# Patient Record
Sex: MALE | Race: WHITE
[De-identification: names, ages, dates, MRNs, and addresses within clinical notes are randomized per-mention and may not be internally consistent; named-entity substitution may affect disease eponyms.]

---

## 2019-02-19 ENCOUNTER — HOSPITAL ENCOUNTER (OUTPATIENT)
Dept: HOSPITAL 84 - D.LABREF | Age: 64
Discharge: HOME | End: 2019-02-19
Attending: ORTHOPAEDIC SURGERY
Payer: SELF-PAY

## 2019-02-19 DIAGNOSIS — M17.12: Primary | ICD-10-CM

## 2019-02-19 DIAGNOSIS — Z11.8: ICD-10-CM

## 2019-02-25 ENCOUNTER — HOSPITAL ENCOUNTER (INPATIENT)
Dept: HOSPITAL 84 - D.SDCHOLD | Age: 64
LOS: 17 days | Discharge: HOME | DRG: 470 | End: 2019-03-14
Attending: ORTHOPAEDIC SURGERY | Admitting: ORTHOPAEDIC SURGERY
Payer: COMMERCIAL

## 2019-02-25 VITALS
HEIGHT: 71 IN | BODY MASS INDEX: 32.83 KG/M2 | BODY MASS INDEX: 32.83 KG/M2 | WEIGHT: 234.49 LBS | BODY MASS INDEX: 32.83 KG/M2

## 2019-02-25 DIAGNOSIS — E78.5: ICD-10-CM

## 2019-02-25 DIAGNOSIS — M17.12: Primary | ICD-10-CM

## 2019-02-25 DIAGNOSIS — I10: ICD-10-CM

## 2019-03-06 LAB
ANION GAP SERPL CALC-SCNC: 12.2 MMOL/L (ref 8–16)
APPEARANCE UR: (no result)
APTT BLD: 26.7 SECONDS (ref 22.8–39.4)
BACTERIA #/AREA URNS HPF: (no result) /HPF
BASOPHILS NFR BLD AUTO: 0.2 % (ref 0–2)
BILIRUB SERPL-MCNC: NEGATIVE MG/DL
BUN SERPL-MCNC: 16 MG/DL (ref 7–18)
CALCIUM SERPL-MCNC: 8.9 MG/DL (ref 8.5–10.1)
CHLORIDE SERPL-SCNC: 103 MMOL/L (ref 98–107)
CO2 SERPL-SCNC: 29 MMOL/L (ref 21–32)
COLOR UR: YELLOW
CREAT SERPL-MCNC: 0.7 MG/DL (ref 0.6–1.3)
EOSINOPHIL NFR BLD: 1.5 % (ref 0–7)
ERYTHROCYTE [DISTWIDTH] IN BLOOD BY AUTOMATED COUNT: 13.3 % (ref 11.5–14.5)
GLUCOSE SERPL-MCNC: 111 MG/DL (ref 74–106)
GLUCOSE SERPL-MCNC: NEGATIVE MG/DL
HCT VFR BLD CALC: 43.4 % (ref 42–54)
HGB BLD-MCNC: 14.6 G/DL (ref 13.5–17.5)
IMM GRANULOCYTES NFR BLD: 1.2 % (ref 0–5)
INR PPP: 0.96 (ref 0.85–1.17)
KETONES UR STRIP-MCNC: NEGATIVE MG/DL
LYMPHOCYTES NFR BLD AUTO: 21.9 % (ref 15–50)
MCH RBC QN AUTO: 31.5 PG (ref 26–34)
MCHC RBC AUTO-ENTMCNC: 33.6 G/DL (ref 31–37)
MCV RBC: 93.7 FL (ref 80–100)
MONOCYTES NFR BLD: 7 % (ref 2–11)
NEUTROPHILS NFR BLD AUTO: 68.2 % (ref 40–80)
NITRITE UR-MCNC: NEGATIVE MG/ML
OSMOLALITY SERPL CALC.SUM OF ELEC: 280 MOSM/KG (ref 275–300)
PH UR STRIP: 5 [PH] (ref 5–6)
PLATELET # BLD: 161 10X3/UL (ref 130–400)
PMV BLD AUTO: 10.5 FL (ref 7.4–10.4)
POTASSIUM SERPL-SCNC: 4.2 MMOL/L (ref 3.5–5.1)
PROT UR-MCNC: NEGATIVE MG/DL
PROTHROMBIN TIME: 12.3 SECONDS (ref 11.6–15)
RBC # BLD AUTO: 4.63 10X6/UL (ref 4.2–6.1)
RBC #/AREA URNS HPF: (no result) /HPF (ref 0–5)
SODIUM SERPL-SCNC: 140 MMOL/L (ref 136–145)
SP GR UR STRIP: 1.01 (ref 1–1.02)
SQUAMOUS #/AREA URNS HPF: (no result) /HPF (ref 0–5)
UROBILINOGEN UR-MCNC: NORMAL MG/DL
WBC # BLD AUTO: 8.3 10X3/UL (ref 4.8–10.8)

## 2019-03-07 ENCOUNTER — HOSPITAL ENCOUNTER (OUTPATIENT)
Dept: HOSPITAL 84 - D.CT | Age: 64
Discharge: HOME | End: 2019-03-07
Attending: ORTHOPAEDIC SURGERY
Payer: COMMERCIAL

## 2019-03-07 DIAGNOSIS — J84.9: Primary | ICD-10-CM

## 2019-03-07 NOTE — NUR
PATIENT SHOWED UP TO OUR DEPT AS AN OUTPATIENT, NO ORDERS WERE SUBMITTED IN
[facility].
CALLED ORDERING PHYSICIAN TO FIGURE OUT WHAT TYPE OF SCAN THE PATIENT NEEDED.
DR. BAN TORRES GAVE ME A VERBAL ORDER TO DO A CT CHEST WITHOUT CONTRAST FOR AN
ABNORMAL CHEST XRAY @ 1750.

## 2019-03-11 VITALS — DIASTOLIC BLOOD PRESSURE: 92 MMHG | SYSTOLIC BLOOD PRESSURE: 141 MMHG

## 2019-03-11 VITALS — DIASTOLIC BLOOD PRESSURE: 82 MMHG | SYSTOLIC BLOOD PRESSURE: 140 MMHG

## 2019-03-11 VITALS — SYSTOLIC BLOOD PRESSURE: 131 MMHG | DIASTOLIC BLOOD PRESSURE: 75 MMHG

## 2019-03-11 PROCEDURE — 0SRD0JZ REPLACEMENT OF LEFT KNEE JOINT WITH SYNTHETIC SUBSTITUTE, OPEN APPROACH: ICD-10-PCS | Performed by: ORTHOPAEDIC SURGERY

## 2019-03-12 VITALS — DIASTOLIC BLOOD PRESSURE: 71 MMHG | SYSTOLIC BLOOD PRESSURE: 128 MMHG

## 2019-03-12 VITALS — SYSTOLIC BLOOD PRESSURE: 114 MMHG | DIASTOLIC BLOOD PRESSURE: 63 MMHG

## 2019-03-12 VITALS — DIASTOLIC BLOOD PRESSURE: 75 MMHG | SYSTOLIC BLOOD PRESSURE: 140 MMHG

## 2019-03-12 VITALS — SYSTOLIC BLOOD PRESSURE: 125 MMHG | DIASTOLIC BLOOD PRESSURE: 76 MMHG

## 2019-03-12 VITALS — DIASTOLIC BLOOD PRESSURE: 68 MMHG | SYSTOLIC BLOOD PRESSURE: 118 MMHG

## 2019-03-12 LAB
ERYTHROCYTE [DISTWIDTH] IN BLOOD BY AUTOMATED COUNT: 13.9 % (ref 11.5–14.5)
HCT VFR BLD CALC: 39 % (ref 42–54)
HGB BLD-MCNC: 12.8 G/DL (ref 13.5–17.5)
MCH RBC QN AUTO: 31.1 PG (ref 26–34)
MCHC RBC AUTO-ENTMCNC: 32.8 G/DL (ref 31–37)
MCV RBC: 94.7 FL (ref 80–100)
PLATELET # BLD: 187 10X3/UL (ref 130–400)
PMV BLD AUTO: 10.8 FL (ref 7.4–10.4)
RBC # BLD AUTO: 4.12 10X6/UL (ref 4.2–6.1)
WBC # BLD AUTO: 14.1 10X3/UL (ref 4.8–10.8)

## 2019-03-12 NOTE — MORECARE
CASE MANAGEMENT DISCHARGE SUMMARY
 
 
PATIENT: SIL GUERIN                     UNIT: [id number]
ACCOUNT#: [id number]                       ADM DATE: 19
AGE: 63     : 59  SEX: M            ROOM/BED: D.2236    
AUTHOR: ALBIN SCHMITZ                             PHYSICIAN:                               
 
REFERRING PHYSICIAN: BAN TORRES MD        
DATE OF SERVICE: 19
Discharge Plan
 
 
Patient Name: SIL GUERIN
[facility]
Encounter #: [id number]
Medical Record #: [id number]
: 1955
Planned Disposition: Home
Anticipated Discharge Date: 
 
Discharge Date: 
Expected LOS: 
Initial Reviewer: [email or web address]
Initial Review Date: 2019
Generated: 3/12/19   1:46 pm 
  
 
 
External Providers
External Provider: OTHER-OTHER
 
Next Contact Date: 
Service Request Date: 
Service Type: 
Resolution: 
 
Reviewer: 
Comments: 
 
 
 
 
 
Patient Name: SIL GUERIN
 
Encounter #: [id number]
Page [phone]
 
 
 
 
 
Electronically Signed by ALBIN SCHMITZ on 19 at 1246
 
 
 
 
 
 
**All edits/amendments must be made on the electronic document**
 
DICTATION DATE: 19 1245     : ENEDINA  19 1245     
RPT#: [id number]                                DC DATE:        
                                               STATUS: ADM IN  
[facility]
[address] [place] [address]
***END OF REPORT***

## 2019-03-12 NOTE — MORECARE
CASE MANAGEMENT DISCHARGE SUMMARY
 
 
PATIENT: DK LOZADA                     UNIT: [id number]
ACCOUNT#: [id number]                       ADM DATE: 19
AGE: 63     : 59  SEX: M            ROOM/BED: D.2236    
AUTHOR: NADIRDOC                             PHYSICIAN:                               
 
REFERRING PHYSICIAN: BAN TORRES MD        
DATE OF SERVICE: 19
Discharge Plan
 
 
Patient Name: DK LOZADA
Facility: [facility]:[place]
Encounter #: [id number]
Medical Record #: [id number]
: 1955
Planned Disposition: Home
Anticipated Discharge Date: 
 
Discharge Date: 
Expected LOS: 
Initial Reviewer: [email or web address]
Initial Review Date: 2019
Generated: 3/12/19   2:21 pm 
Comments
 
DCP- Discharge Planning
 
Updated by [email or web address]: Jagruti Alatorre on 3/12/19  12:18 pm CT
Patient Name: DK LOZADA                                     
Admission Status: Elective   
Accout number: [id number]                              
Admission Date: 2019   
: 1955                                                        
Admission Diagnosis:   
Attending: BAN TORRES                                               
Current LOS:  1   
  
Anticipated DC Date:    
Planned Disposition: Home   
Primary Insurance: [employer]   
  
  
Discharge Planning Comments: CM met with patient to discuss discharge 
planning/needs, he is alone in the room. States he lives with his wife. 
States he is independent with all ADL's and AIDL's. States his wife will 
drive him home on discharge and to his outpatient PT appointments. States he 
will use [facility] for outpt PT. States he has already been going there.
 
 I called [facility] and they are unable to help him with DME, they do not have 
CPM machines. I called [employer] and spoke to Dk, he already has the order 
for DME from Dr. Torres's office. He states he will call the patient and 
deliver it to his home. He does have a 2 wheeled walker that he borrowed that 
he will use to get home. [facility] and spoke to 
Yasemin. Yasemin states they will call him with the appointment date and time.
 Order and clinical faxed to them. CM will continue to follow and assist with 
discharge planning/needs.  
  
  
  
  
  
  
: Jagruti Alatorre
 DCPIA - Discharge Planning Initial Assessment
 
Updated by [email or web address]: Jagruti Alatorre on 3/12/19   1:11 pm
*  Is the patient Alert and Oriented?
Yes
*  How many steps to enter\exit or inside your home? 2/0 *  PCP Dr. Calle *  [facility]
[place]
*  Preadmission Environment
Home with Family
*  ADLs
Independent
*  Equipment
Other
*  Other Equipment
Rollator walker
*  List name and contact numbers for known caregivers / representatives who 
currently or will assist patient after discharge:
Margaret Lozada - [phone]
*  Verbal permission to speak to the caregivers and representatives has been 
obtained from the patient.
Yes
*  Community resources currently utilized
Other
*  Please name any agencies selected above.
Outpatient PT at [facility]
*  Additional services required to return to the preadmission environment?
Yes
*  Can the patient safely return to the preadmission environment?
Yes
*  Has this patient been hospitalized within the prior 30 days at any 
hospital?
No
 
 
 
 
 
 
 
 
Last DP export: 3/12/19  12:14 p
Patient Name: DK LOZADA
Encounter #: [id number]
Page [phone]
 
 
 
 
 
Electronically Signed by ALBIN SCHMITZ on 19 at 1322
 
 
 
 
 
 
**All edits/amendments must be made on the electronic document**
 
DICTATION DATE: 19 1321     : ENEDINA  19 1321     
RPT#: [id number]                                DC DATE:        
                                               STATUS: ADM IN  
[facility]
[address] [place] [address]
***END OF REPORT***

## 2019-03-12 NOTE — MORECARE
CASE MANAGEMENT DISCHARGE SUMMARY
 
 
PATIENT: SIL LOZADA                     UNIT: [id number]
ACCOUNT#: [id number]                       ADM DATE: 19
AGE: 63     : 59  SEX: M            ROOM/BED: D.2236    
AUTHOR: ALBIN SCHMITZ                             PHYSICIAN:                               
 
REFERRING PHYSICIAN: BAN TORRES MD        
DATE OF SERVICE: 19
Discharge Plan
 
 
Patient Name: SIL LOZADA
[facility]
Encounter #: [id number]
Medical Record #: [id number]
: 1955
Planned Disposition: Home
Anticipated Discharge Date: 
 
Discharge Date: 
Expected LOS: 
Initial Reviewer: [email or web address]
Initial Review Date: 2019
Generated: 3/12/19   2:14 pm 
 DCPIA - Discharge Planning Initial Assessment
 
Updated by [email or web address]: Jagruti Alatorre on 3/12/19   1:11 pm
*  Is the patient Alert and Oriented?
Yes
*  How many steps to enter\exit or inside your home? 2/0 *  PCP Dr. Calle *  Pharm[facility]
by the [facility]
*  Preadmission Environment
Home with Family
*  ADLs
Independent
*  Equipment
Other
*  Other Equipment
Rollator walker
*  List name and contact numbers for known caregivers / representatives who 
currently or will assist patient after discharge:
Margaret Lozada - [phone]
*  Verbal permission to speak to the caregivers and representatives has been 
obtained from the patient.
Yes
*  Community resources currently utilized
Other
*  Please name any agencies selected above.
 
Outpatient PT at [facility]
*  Additional services required to return to the preadmission environment?
Yes
*  Can the patient safely return to the preadmission environment?
Yes
*  Has this patient been hospitalized within the prior 30 days at any 
hospital?
No
 
 
 
 
 
 
 
Last DP export: 3/12/19  11:46 a
Patient Name: SIL LOZADA
Encounter #: [id number]
Page [id number]
 
 
 
 
 
Electronically Signed by ALBIN SCHMITZ on 19 at 1314
 
 
 
 
 
 
**All edits/amendments must be made on the electronic document**
 
DICTATION DATE: 19     : ENEDINA  19     
RPT#: [id number]                                DC DATE:        
                                               STATUS: ADM IN  
[facility]
 [place] [address]
***END OF REPORT***

## 2019-03-12 NOTE — OP
PATIENT NAME:  SIL GUERIN                           MEDICAL RECORD: [id number]
:[birth year]                                             LOCATION:D.MS [id number]
                                                         ADMISSION DATE:19
SURGEON:  BAN TORRES MD        
 
 
DATE OF OPERATION:  2019
 
PREOPERATIVE DIAGNOSIS:  Severe degenerative arthritis, left knee.
 
POSTOPERATIVE DIAGNOSIS:  Severe degenerative arthritis, left knee.
 
PROCEDURE:  Left total knee arthroplasty.
 
SURGEON:  Ban Torres MD
 
ASSISTANT:  BEV Park
 
INTRAOPERATIVE COMPLICATIONS:  None.
 
SUMMARY OF PATHOLOGIC FINDINGS:  Severe osteoarthritis of the left knee
consistent with preoperative radiographs.
 
IMPLANTS USED:  [place] triathlon total knee arthroplasty, size 6 press fit
distal femur, size 6 press fit tibial baseplate, size 9 polyethylene insert,
size 31 Tritanium patella press fit.
 
OPERATIVE SUMMARY IN DETAIL:  After obtaining the appropriate preoperative
orthopedic surgery consent as well as anesthetic consultation, evaluation and
clearance, the patient was brought to the operating room and placed on the
operating table in supine position.  After general laryngeal mask airway was
administered, tourniquet was placed on the proximal aspect of the left lower
extremity.  Left lower extremity was prepped and draped in routine sterile
fashion.  The leg was elevated and exsanguinated and tourniquet was inflated to
350 mmHg.  Routine midline incision was taken down for paramedian arthrotomy. 
Patella was everted, distal femur was exposed.  Soft tissue excision was done in
the usual fashion.  An intramedullary guide hole was created for distal
intramedullary guided cut.  This was followed by complete exposure of the
proximal tibia, where likewise an intramedullary guide hole was created for
intramedullary guided proximal tibial cut.  Appropriate measurements were taken.
 Distal chamfer cuts were made.  Trials corresponding to the above final trials
put in place, taken through range of motion and found to be stable in all
planes.  Final distal femoral and proximal tibial preparations were made. 
Having completed this, the arthritic surface of the patella was excised in
preparation for press fit 31 Tritanium patella.  Pulsatile lavage irrigation was
then followed by cleansing the bone ends.  Final components were press fit into
place.  The knee was taken through range of motion and found to be stable in all
planes with excellent patellar tracking.  Having completed this, a gram of
vancomycin and a gram of tobramycin powder each was placed in the knee. 
Paramedian arthrotomy was closed with #2 Ethibond by Soto Murguia followed by
skin closure with #1 Vicryl, 2-0 Vicryl, and skin santosh also by Soto Murguia. 
Having completed this, sterile dressings were applied.  Tourniquet was deflated.
 The patient was awakened and taken to recovery room in stable condition.  All
final needle and sponge counts were correct.
 
TRANSINT:[id number] Voice Confirmation ID: [id number] DOCUMENT ID: [id number]
 
 
 
OPERATIVE REPORT                               [id number]    SIL GUERIN MD, BAN CUELLAR        
 
 
 
Electronically Signed by BAN TORRES MD on 19 at 1335
 
 
 
 
 
 
 
 
 
 
 
 
 
 
 
 
 
 
 
 
 
 
 
 
 
 
 
 
 
 
 
 
 
 
 
 
 
 
 
 
 
CC:                                                             [id number]
DICTATION DATE: 19 1121     :     19 1224      ADM IN  
                                                                              
[facility]                                          
[address] [id number]

## 2019-03-12 NOTE — NUR
PT AAOX4 RESP EVEN AND NONLABROED, CMP ON AT THIS TIME CL IN REACH NO SIGNS OF
DISTRESS NOTED, WILL CONTINUE TO MONITOR

## 2019-03-13 VITALS — SYSTOLIC BLOOD PRESSURE: 119 MMHG | DIASTOLIC BLOOD PRESSURE: 65 MMHG

## 2019-03-13 VITALS — DIASTOLIC BLOOD PRESSURE: 63 MMHG | SYSTOLIC BLOOD PRESSURE: 115 MMHG

## 2019-03-13 VITALS — DIASTOLIC BLOOD PRESSURE: 60 MMHG | SYSTOLIC BLOOD PRESSURE: 110 MMHG

## 2019-03-13 VITALS — DIASTOLIC BLOOD PRESSURE: 66 MMHG | SYSTOLIC BLOOD PRESSURE: 133 MMHG

## 2019-03-13 VITALS — SYSTOLIC BLOOD PRESSURE: 135 MMHG | DIASTOLIC BLOOD PRESSURE: 64 MMHG

## 2019-03-13 VITALS — SYSTOLIC BLOOD PRESSURE: 124 MMHG | DIASTOLIC BLOOD PRESSURE: 54 MMHG

## 2019-03-13 LAB
ERYTHROCYTE [DISTWIDTH] IN BLOOD BY AUTOMATED COUNT: 14.2 % (ref 11.5–14.5)
HCT VFR BLD CALC: 34.6 % (ref 42–54)
HGB BLD-MCNC: 11.4 G/DL (ref 13.5–17.5)
MCH RBC QN AUTO: 31.1 PG (ref 26–34)
MCHC RBC AUTO-ENTMCNC: 32.9 G/DL (ref 31–37)
MCV RBC: 94.3 FL (ref 80–100)
PLATELET # BLD: 163 10X3/UL (ref 130–400)
PMV BLD AUTO: 10.9 FL (ref 7.4–10.4)
RBC # BLD AUTO: 3.67 10X6/UL (ref 4.2–6.1)
WBC # BLD AUTO: 14.2 10X3/UL (ref 4.8–10.8)

## 2019-03-13 NOTE — NUR
PT IS RESTING IN BED WITH EYES OPEN. RESPIRATIONS ARE EVEN AND UNLABORED. CPM
MACHINE IS ON LEFT LOWER EXTREMITY. PT DENIES PRESENCE OF PAIN AT THIS TIME.
DRESSING TO LEFT LOWER EXTREMITY IS C/D/I. SCD IS ON RIGHT LOWER EXTREMITY AND
PLEXI PULSE IS ON LEFT FOOT. PT DENIES PRESENCE OF N/V. BED IS IN THE LOWEST
POSITION. CALL LIGHT AND BEDSIDE TABLE ARE WITHIN REACH. WILL CONT TO MONITOR.

## 2019-03-13 NOTE — NUR
PT HAD PCT AT 2311 EFFECTIVE, CPM MACHINE REMOVED AT 2100, NO OTHER NEEDS
NOTED, IV ATB FINISHED NO ADVERSE REACTIONS NOTED, FLUIDS AND CALL LIGHT
WITHIN REACH

## 2019-03-14 VITALS — SYSTOLIC BLOOD PRESSURE: 112 MMHG | DIASTOLIC BLOOD PRESSURE: 58 MMHG

## 2019-03-14 VITALS — SYSTOLIC BLOOD PRESSURE: 114 MMHG | DIASTOLIC BLOOD PRESSURE: 41 MMHG

## 2019-03-14 NOTE — MORECARE
CASE MANAGEMENT DISCHARGE SUMMARY
 
 
PATIENT: DK LOZADA                     UNIT: [id number]
ACCOUNT#: [id number]                       ADM DATE: 19
AGE: 63     : 59  SEX: M            ROOM/BED: D.2236    
AUTHOR: ALBIN SCHMITZ                             PHYSICIAN:                               
 
REFERRING PHYSICIAN: BAN TORRES MD        
DATE OF SERVICE: 19
Discharge Plan
 
 
Patient Name: DK LOZADA
Facility: [facility]:[place]
Encounter #: [id number]
Medical Record #: [id number]
: 1955
Planned Disposition: Home
Anticipated Discharge Date: 
 
Discharge Date: 
Expected LOS: 
Initial Reviewer: [id number]
Initial Review Date: 2019
Generated: 3/14/19  10:02 am 
Comments
 
DCP- Discharge Planning
 
Updated by [email or web address]: Jagruti Alatorre on 3/14/19   7:57 am CT
Received order for discharge. He states his wife will be here at 11:00 to 
pick him up. He states that Gilbert from [employer] has called and arranged with his 
wife when to deliver the equipment. He does have a walker to go home with. He 
states that [facility] has set up outpatient PT with his wife. He denies 
other needs at this time. CM will continue to follow and assist with 
discharge planning. Home today with outpatient PT.
DCP- Discharge Planning
 
Updated by [email or web address]: Jagruti Alatorre on 3/12/19  12:18 pm CT
Patient Name: DK LOZADA                                     
Admission Status: Elective   
Accout number: [id number]                              
Admission Date: 2019   
: 1955                                                        
Admission Diagnosis:   
Attending: BAN TORRES                                               
Current LOS:  1   
  
Anticipated DC Date:    
 
Planned Disposition: Home   
Primary Insurance: [employer] H[employer]   
  
  
Discharge Planning Comments: CM met with patient to discuss discharge 
planning/needs, he is alone in the room. States he lives with his wife. 
States he is independent with all ADL's and AIDL's. States his wife will 
drive him home on discharge and to his outpatient PT appointments. States he 
will use [facility] for outpt PT. States he has already been going there.
 I called [facility] and they are unable to help him with DME, they do not have 
CPM machines. I called [employer] and spoke to Dk, he already has the order 
for DME from Dr. Torres's office. He states he will call the patient and 
deliver it to his home. He does have a 2 wheeled walker that he borrowed that 
he will use to get home. I called [facility] and spoke to 
Yasemin. Yasemin states they will call him with the appointment date and time.
 Order and clinical faxed to them. CM will continue to follow and assist with 
discharge planning/needs.  
  
  
  
  
  
  
: Jagruti Alatorre
 DCPIA - Discharge Planning Initial Assessment
 
Updated by [id number]: Jagruti Alatorre on 3/12/19   1:11 pm
*  Is the patient Alert and Oriented?
Yes
*  How many steps to enter\exit or inside your home? 2/0 *  PCP Dr. Calle *  Pharmacy [facility]
[place]
*  Preadmission Environment
Home with Family
*  ADLs
Independent
*  Equipment
Other
*  Other Equipment
Rollator walker
*  List name and contact numbers for known caregivers / representatives who 
currently or will assist patient after discharge:
Margaret Lozada - [phone]
*  Verbal permission to speak to the caregivers and representatives has been 
obtained from the patient.
Yes
*  Community resources currently utilized
Other
*  Please name any agencies selected above.
Outpatient PT at [facility]
*  Additional services required to return to the preadmission environment?
Yes
*  Can the patient safely return to the preadmission environment?
 
Yes
*  Has this patient been hospitalized within the prior 30 days at any 
hospital?
No
 
 
 
 
 
 
 
Last DP export: 3/12/19  12:21 p
Patient Name: DK LOZADA
Encounter #: [id number]
Page [phone]
 
 
 
 
 
Electronically Signed by ALBIN SCHMITZ on 19 at 0902
 
 
 
 
 
 
**All edits/amendments must be made on the electronic document**
 
DICTATION DATE: 19     : ENEDINA  19     
RPT#: [id number]                                DC DATE:        
                                               STATUS: ADM IN  
[facility]
[address] [place] [address]
***END OF REPORT***

## 2019-03-14 NOTE — NUR
DISCHARGE INSTRUCTIONS COVERED WITH PT. ALL QUESTIONS ANSWERED. DISCHARGE
PAPERS SIGNED BY PT. PRINTED RX FOR PERCOCET AND ELIQUIS GIVEN TO PT. PT
DENIES FURTHER QUESTIONS/CONCERNS. (5) MEPILEX AG DRESSINGS GIVEN TO PT PER
ORDER. LEFT HAND PIV REMOVED WITH CATHETER TIP INTACT. DRESSING APPLIED. BED
IS IN THE LOWEST POSITION. CALL LIGHT AND BEDSIDE TABLE ARE WITHIN REACH. PT
TO NOTIFY NURSE WHEN TRANSPORTATION ARRIVES. WILL CONT TO MONITOR.

## 2019-03-14 NOTE — NUR
PT IS RESTING IN BED WITH EYES OPEN. CPM MACHINE IS ON AND RUNNING. PT DENIES
PRESENCE OF PAIN AT THIS TIME. DRESSING TO LEFT LOWER EXTREMITY IS C/D/I. BED
IS IN THE LOWEST POSITION. CALL LIGHT AND BEDSIDE TABLE ARE WITHINR EACH.
WALKER IS WITHIN REACH. PT DENIES FURTHER NEEDS AT THIS TIME. WILL CONT TO
MONITOR.

## 2019-03-14 NOTE — NUR
PT TRANSPORTED TO VEHICLE FROM ROOM VIA WHEELCHAIR. PT STATES THAT HE DOES
HAVE PRINTED RX AND ALL BELONGINGS ON HIS PERSON. PT DENIES FURTHER
QUESTIONS/CONCERNS AT THIS TIME.

## 2019-03-25 ENCOUNTER — HOSPITAL ENCOUNTER (OUTPATIENT)
Dept: HOSPITAL 84 - D.US | Age: 64
Discharge: HOME | End: 2019-03-25
Attending: CLINICAL NURSE SPECIALIST
Payer: COMMERCIAL

## 2019-03-25 VITALS — BODY MASS INDEX: 32.7 KG/M2

## 2019-03-25 DIAGNOSIS — R60.0: Primary | ICD-10-CM
